# Patient Record
Sex: MALE | Race: WHITE | NOT HISPANIC OR LATINO | URBAN - METROPOLITAN AREA
[De-identification: names, ages, dates, MRNs, and addresses within clinical notes are randomized per-mention and may not be internally consistent; named-entity substitution may affect disease eponyms.]

---

## 2021-02-12 ENCOUNTER — OFFICE VISIT (OUTPATIENT)
Dept: URBAN - METROPOLITAN AREA CLINIC 63 | Facility: CLINIC | Age: 56
End: 2021-02-12

## 2021-03-02 ENCOUNTER — OFFICE VISIT (OUTPATIENT)
Dept: URBAN - METROPOLITAN AREA SURGERY CENTER 4 | Facility: SURGERY CENTER | Age: 56
End: 2021-03-02

## 2021-03-04 LAB — PATHOLOGY (INDENTED REPORT): (no result)

## 2021-03-17 ENCOUNTER — OFFICE VISIT (OUTPATIENT)
Dept: URBAN - METROPOLITAN AREA CLINIC 63 | Facility: CLINIC | Age: 56
End: 2021-03-17

## 2021-04-28 ENCOUNTER — OFFICE VISIT (OUTPATIENT)
Dept: URBAN - METROPOLITAN AREA CLINIC 63 | Facility: CLINIC | Age: 56
End: 2021-04-28

## 2021-07-21 ENCOUNTER — OFFICE VISIT (OUTPATIENT)
Dept: URBAN - METROPOLITAN AREA CLINIC 63 | Facility: CLINIC | Age: 56
End: 2021-07-21

## 2022-07-09 ENCOUNTER — TELEPHONE ENCOUNTER (OUTPATIENT)
Dept: URBAN - METROPOLITAN AREA CLINIC 121 | Facility: CLINIC | Age: 57
End: 2022-07-09

## 2022-07-09 RX ORDER — CHROMIUM 200 MCG
TABLET ORAL ONCE A DAY
Refills: 0 | OUTPATIENT
Start: 2021-03-17 | End: 2021-04-28

## 2022-07-09 RX ORDER — ASPIRIN 81 MG/1
TABLET, FILM COATED ORAL ONCE A DAY
Refills: 0 | OUTPATIENT
Start: 2021-03-17 | End: 2021-04-28

## 2022-07-09 RX ORDER — ASPIRIN 81 MG/1
TABLET, FILM COATED ORAL
Refills: 0 | OUTPATIENT
Start: 2021-02-12 | End: 2021-03-17

## 2022-07-09 RX ORDER — OMEGA-3S/DHA/EPA/FISH OIL 980-1400MG
CAPSULE,DELAYED RELEASE (ENTERIC COATED) ORAL ONCE A DAY
Refills: 0 | OUTPATIENT
Start: 2021-03-17 | End: 2021-04-28

## 2022-07-09 RX ORDER — OMEPRAZOLE 20 MG/1
TAKE ONE CAPSULE PO TWICE DAILY 30 MINS BEFORE MEALS CAPSULE, DELAYED RELEASE ORAL TWICE A DAY
Refills: 1 | OUTPATIENT
Start: 2021-02-12 | End: 2021-09-20

## 2022-07-09 RX ORDER — CHROMIUM 200 MCG
TABLET ORAL
Refills: 0 | OUTPATIENT
Start: 2021-02-12 | End: 2021-03-17

## 2022-07-09 RX ORDER — OMEPRAZOLE 20 MG/1
TAKE ONE CAPSULE PO TWICE DAILY 30 MINS BEFORE MEALS CAPSULE, DELAYED RELEASE ORAL TWICE A DAY
Refills: 1 | OUTPATIENT
Start: 2021-09-20 | End: 2021-11-19

## 2022-07-10 ENCOUNTER — TELEPHONE ENCOUNTER (OUTPATIENT)
Dept: URBAN - METROPOLITAN AREA CLINIC 121 | Facility: CLINIC | Age: 57
End: 2022-07-10

## 2022-07-10 RX ORDER — CHROMIUM 200 MCG
TABLET ORAL ONCE A DAY
Refills: 0 | Status: ACTIVE | COMMUNITY
Start: 2021-04-28

## 2022-07-10 RX ORDER — OMEPRAZOLE 20 MG/1
TAKE ONE CAPSULE PO TWICE DAILY 30 MINS BEFORE MEALS CAPSULE, DELAYED RELEASE ORAL TWICE A DAY
Refills: 1 | Status: ACTIVE | COMMUNITY
Start: 2021-02-12

## 2022-07-10 RX ORDER — ASPIRIN 81 MG/1
TABLET, FILM COATED ORAL ONCE A DAY
Refills: 0 | Status: ACTIVE | COMMUNITY
Start: 2021-04-28

## 2022-07-10 RX ORDER — DICYCLOMINE HYDROCHLORIDE 10 MG/1
TAKE UP TO THREE TIMES A DAY PO CAPSULE ORAL THREE TIMES A DAY
Refills: 1 | Status: ACTIVE | COMMUNITY
Start: 2021-03-17

## 2022-07-10 RX ORDER — OMEPRAZOLE 20 MG/1
USE AS DIRECTED TAKE ONE CAPSULE PO ONCE DAILY 30 MINUTES BEFORE BREAKFAST CAPSULE, DELAYED RELEASE ORAL
Refills: 1 | Status: ACTIVE | COMMUNITY
Start: 2021-03-17

## 2022-07-10 RX ORDER — OMEGA-3S/DHA/EPA/FISH OIL 980-1400MG
CAPSULE,DELAYED RELEASE (ENTERIC COATED) ORAL ONCE A DAY
Refills: 0 | Status: ACTIVE | COMMUNITY
Start: 2021-04-28

## 2022-07-10 RX ORDER — ONDANSETRON HYDROCHLORIDE 4 MG/1
USE AS DIRECTED TAKE ONE TABLET 30 MINUTES BEFORE EACH BOTTLE OF MAGNESIUM CITRATE FOR COLONOSCOPY PREP TABLET, FILM COATED ORAL
Refills: 0 | Status: ACTIVE | COMMUNITY
Start: 2021-02-12

## 2022-07-10 RX ORDER — OMEPRAZOLE 20 MG/1
TAKE ONE CAPSULE PO TWICE DAILY 30 MINS BEFORE MEALS CAPSULE, DELAYED RELEASE ORAL TWICE A DAY
Refills: 1 | Status: ACTIVE | COMMUNITY
Start: 2021-11-19

## 2022-07-30 ENCOUNTER — TELEPHONE ENCOUNTER (OUTPATIENT)
Age: 57
End: 2022-07-30

## 2022-07-31 ENCOUNTER — TELEPHONE ENCOUNTER (OUTPATIENT)
Age: 57
End: 2022-07-31

## 2022-12-02 ENCOUNTER — TELEPHONE ENCOUNTER (OUTPATIENT)
Dept: URBAN - METROPOLITAN AREA CLINIC 63 | Facility: CLINIC | Age: 57
End: 2022-12-02

## 2022-12-02 RX ORDER — OMEPRAZOLE 20 MG/1
USE AS DIRECTED TAKE ONE CAPSULE PO ONCE DAILY 30 MINUTES BEFORE BREAKFAST CAPSULE, DELAYED RELEASE ORAL
Qty: 90 CAPSULE | Refills: 1

## 2023-01-10 ENCOUNTER — OFFICE VISIT (OUTPATIENT)
Dept: URBAN - METROPOLITAN AREA CLINIC 63 | Facility: CLINIC | Age: 58
End: 2023-01-10

## 2023-01-10 ENCOUNTER — TELEPHONE ENCOUNTER (OUTPATIENT)
Dept: URBAN - METROPOLITAN AREA CLINIC 63 | Facility: CLINIC | Age: 58
End: 2023-01-10

## 2023-01-10 NOTE — HPI-TODAY'S VISIT:
Rico is a pleasant 56-year-old male who presents today for follow up.     FibroScan dated 2/17/2021 showed S0 and F0    EGD dated 3/2/2021 revealed a regular Z-line.  Gastritis.  Normal third portion of the duodenum. Negative for H. pylori.  Benign lower third esophageal biopsies.    Colonoscopy dated 3/2/2021 revealed 1 diminutive tubular adenoma removed at the hepatic flexure.  2 diminutive HP polyps removed at the rectosigmoid colon.  Nonbleeding internal hemorrhoids    CT of the abdomen/pelvis with contrast dated 2/24/2021 revealed a single colonic diverticula involving the ascending colon with moderate colonic wall thickening involving the cecum.  No pericolonic inflammation or lymphadenopathy.  These findings are nonspecific but differential includes early ascending diverticulitis versus cecal mass.  Appendix is normal.  Multiple hepatic lesions as above, largest measuring 1.8 x 1.7 cm.  These likely represent hepatic cysts but are indeterminant on this single phase examination.  Mild prostatomegaly.  Sludge in the gallbladder.  Mild nonspecific bowel wall thickening involving loops of jejunum in the left upper quadrant.    Ultrasound of the abdomen dated 1/6/2021 revealed hepatic steatosis.  Multi septated cyst of the right hepatic lobe.    CT enterography dated 3/24/2021 demonstrated suspicious mild colitis involving the ascending colon, cecum and portions of the proximal transverse colon.  Interval resolution of the previously seen jejunal wall thickening. Findings not appreciated on colonoscopy after reviewing imaging with radiology. Patient declined flex sig and denied symptoms consistent with colitis. Elected to repeat MRE instead.    MRE dated 4/21/2021 demonstrated no active inflammatory process identified within large or small bowel.  Mild circumferential wall prominence and delayed mucosal hyperenhancement of a short segment of the cecum at the level of the ileocecal valve the etiology of which is uncertain.  Recommend correlation with colonoscopy.  Enhancement pattern and presence in February 2021 suggest low-grade chronic process. Again, no abnormality appreciated on colonoscopy.    Recommended last visit he follow up with urology and general surgery. He was evaluated by urology and started on an alpha blocker. Notes 1-2 daily bowel movements without constipation or diarrhea. Reflux well controlled on omeprozole 20 mg once daily after decreasing therapy last visit. Denies any nausea, vomiting, dysphagia, odynophagia, hematemesis, coffee ground emesis, change in bowel habits, abnormal weight loss, BRBPR or melena. Continued lower abdominal discomfort when he "puts on a belt". Pain is non related to BMs, worse with large meals and certain positions/strenuous activity. Denies any family history of colon cancer or colon polyps. Notes no other GI complaints at this time. Has admitted to bilateral congenital inguinal hernias not appreciated on imaging

## 2023-12-12 PROBLEM — 197321007: Status: ACTIVE | Noted: 2023-12-12

## 2023-12-13 ENCOUNTER — LAB OUTSIDE AN ENCOUNTER (OUTPATIENT)
Dept: URBAN - METROPOLITAN AREA CLINIC 63 | Facility: CLINIC | Age: 58
End: 2023-12-13

## 2023-12-13 ENCOUNTER — OFFICE VISIT (OUTPATIENT)
Dept: URBAN - METROPOLITAN AREA CLINIC 63 | Facility: CLINIC | Age: 58
End: 2023-12-13
Payer: COMMERCIAL

## 2023-12-13 VITALS
TEMPERATURE: 97 F | DIASTOLIC BLOOD PRESSURE: 80 MMHG | HEIGHT: 65 IN | SYSTOLIC BLOOD PRESSURE: 122 MMHG | WEIGHT: 172.4 LBS | BODY MASS INDEX: 28.72 KG/M2 | OXYGEN SATURATION: 96 % | HEART RATE: 72 BPM

## 2023-12-13 DIAGNOSIS — R93.3 ABNORMAL FINDINGS ON DIAGNOSTIC IMAGING OF DIGESTIVE SYSTEM: ICD-10-CM

## 2023-12-13 DIAGNOSIS — R10.9 ABDOMINAL CRAMPING: ICD-10-CM

## 2023-12-13 DIAGNOSIS — K76.0 FATTY LIVER: ICD-10-CM

## 2023-12-13 DIAGNOSIS — K21.9 CHRONIC GERD: ICD-10-CM

## 2023-12-13 PROBLEM — 235595009: Status: ACTIVE | Noted: 2023-12-13

## 2023-12-13 PROCEDURE — 99214 OFFICE O/P EST MOD 30 MIN: CPT | Performed by: PHYSICIAN ASSISTANT

## 2023-12-13 RX ORDER — DICYCLOMINE HYDROCHLORIDE 10 MG/1
TAKE UP TO THREE TIMES A DAY PO CAPSULE ORAL THREE TIMES A DAY
Refills: 1 | Status: ACTIVE | COMMUNITY
Start: 2021-03-17

## 2023-12-13 RX ORDER — OMEGA-3S/DHA/EPA/FISH OIL 980-1400MG
CAPSULE,DELAYED RELEASE (ENTERIC COATED) ORAL ONCE A DAY
Refills: 0 | Status: ACTIVE | COMMUNITY
Start: 2021-04-28

## 2023-12-13 RX ORDER — CHROMIUM 200 MCG
TABLET ORAL ONCE A DAY
Refills: 0 | Status: ACTIVE | COMMUNITY
Start: 2021-04-28

## 2023-12-13 RX ORDER — OMEPRAZOLE 20 MG/1
USE AS DIRECTED TAKE ONE CAPSULE PO ONCE DAILY 30 MINUTES BEFORE BREAKFAST CAPSULE, DELAYED RELEASE ORAL
Qty: 90 CAPSULE | Refills: 1 | Status: ACTIVE | COMMUNITY

## 2023-12-13 RX ORDER — ASPIRIN 81 MG/1
TABLET, FILM COATED ORAL ONCE A DAY
Refills: 0 | Status: ACTIVE | COMMUNITY
Start: 2021-04-28

## 2023-12-13 NOTE — HPI-TODAY'S VISIT:
Rico is a pleasant 58-year-old male who presents today for a follow up.  Patient was evaluated by Dr. Watkins for evaluation of chronic lower abdominal pain.  Concern of inguinal hernia.  Patient chose observation over surgical intervention. Evaluated by vascular surgery for annual surveillance of prior intervention to left ICA and mild stenosis to the right ICA.  Left CEA performed in 2017.  CT abdomen/pelvis without contrast dated 9/26/2023 showed no renal calculi or hydronephrosis visualized.  No acute findings seen within the abdomen and pelvis.  Marked hepatic steatosis.  Normal appendix  Labs dated 6/4/23 showed a normal hemoglobin.  Normal platelets.  Normal renal function.  Normal LFTs.  Iron studies normal.  Normal T4.  Normal folate.  Normal TSH.  Vitamin B12 normal.  FibroScan dated 2/17/2021 showed S0 and F0   EGD dated 3/2/2021 revealed a regular Z-line.  Gastritis.  Normal third portion of the duodenum. Negative for H. pylori.  Benign lower third esophageal biopsies.   Colonoscopy dated 3/2/2021 revealed 1 diminutive tubular adenoma removed at the hepatic flexure.  2 diminutive HP polyps removed at the rectosigmoid colon.  Nonbleeding internal hemorrhoids   CT of the abdomen/pelvis with contrast dated 2/24/2021 revealed a single colonic diverticula involving the ascending colon with moderate colonic wall thickening involving the cecum.  No pericolonic inflammation or lymphadenopathy.  These findings are nonspecific but differential includes early ascending diverticulitis versus cecal mass.  Appendix is normal.  Multiple hepatic lesions as above, largest measuring 1.8 x 1.7 cm.  These likely represent hepatic cysts but are indeterminant on this single phase examination.  Mild prostatomegaly.  Sludge in the gallbladder.  Mild nonspecific bowel wall thickening involving loops of jejunum in the left upper quadrant.   Ultrasound of the abdomen dated 1/6/2021 revealed hepatic steatosis.  Multi septated cyst of the right hepatic lobe.   CT enterography dated 3/24/2021 demonstrated suspicious mild colitis involving the ascending colon, cecum and portions of the proximal transverse colon.  Interval resolution of the previously seen jejunal wall thickening. Findings not appreciated on colonoscopy after reviewing imaging with radiology. Patient declined flex sig and denied symptoms consistent with colitis. Elected to repeat MRE instead.   MRE dated 4/21/2021 demonstrated no active inflammatory process identified within large or small bowel.  Mild circumferential wall prominence and delayed mucosal hyperenhancement of a short segment of the cecum at the level of the ileocecal valve the etiology of which is uncertain.  Recommend correlation with colonoscopy.  Enhancement pattern and presence in February 2021 suggest low-grade chronic process. Again, no abnormality appreciated on colonoscopy.   Notes 1-2 daily bowel movements without constipation or diarrhea. Reflux well controlled on omeprozole 20 mg once daily. Denies any nausea, vomiting, dysphagia, odynophagia, hematemesis, coffee ground emesis, change in bowel habits, abnormal weight loss, BRBPR or melena. Occasional abdominal cramping. Pain is non related to BMs, food or position. Denies any family history of colon cancer or colon polyps. Notes no other GI complaints at this time.

## 2024-01-11 ENCOUNTER — TELEPHONE ENCOUNTER (OUTPATIENT)
Dept: URBAN - METROPOLITAN AREA CLINIC 63 | Facility: CLINIC | Age: 59
End: 2024-01-11

## 2024-01-12 ENCOUNTER — TELEPHONE ENCOUNTER (OUTPATIENT)
Dept: URBAN - METROPOLITAN AREA CLINIC 63 | Facility: CLINIC | Age: 59
End: 2024-01-12

## 2024-01-31 ENCOUNTER — DASHBOARD ENCOUNTERS (OUTPATIENT)
Age: 59
End: 2024-01-31

## 2024-01-31 ENCOUNTER — OFFICE VISIT (OUTPATIENT)
Dept: URBAN - METROPOLITAN AREA CLINIC 63 | Facility: CLINIC | Age: 59
End: 2024-01-31
Payer: COMMERCIAL

## 2024-01-31 VITALS
BODY MASS INDEX: 29.32 KG/M2 | HEIGHT: 65 IN | WEIGHT: 176 LBS | OXYGEN SATURATION: 96 % | TEMPERATURE: 98.6 F | DIASTOLIC BLOOD PRESSURE: 80 MMHG | HEART RATE: 72 BPM | RESPIRATION RATE: 20 BRPM | SYSTOLIC BLOOD PRESSURE: 126 MMHG

## 2024-01-31 DIAGNOSIS — K21.9 CHRONIC GERD: ICD-10-CM

## 2024-01-31 DIAGNOSIS — D12.6 TUBULAR ADENOMA OF COLON: ICD-10-CM

## 2024-01-31 DIAGNOSIS — R10.9 ABDOMINAL CRAMPING: ICD-10-CM

## 2024-01-31 DIAGNOSIS — R93.3 ABNORMAL FINDINGS ON DIAGNOSTIC IMAGING OF DIGESTIVE SYSTEM: ICD-10-CM

## 2024-01-31 PROCEDURE — 99213 OFFICE O/P EST LOW 20 MIN: CPT | Performed by: PHYSICIAN ASSISTANT

## 2024-01-31 RX ORDER — OMEPRAZOLE 20 MG/1
USE AS DIRECTED TAKE ONE CAPSULE PO ONCE DAILY 30 MINUTES BEFORE BREAKFAST CAPSULE, DELAYED RELEASE ORAL
Qty: 90 CAPSULE | Refills: 1 | Status: ACTIVE | COMMUNITY

## 2024-01-31 RX ORDER — DICYCLOMINE HYDROCHLORIDE 10 MG/1
TAKE UP TO THREE TIMES A DAY PO CAPSULE ORAL THREE TIMES A DAY
Refills: 1 | Status: ACTIVE | COMMUNITY
Start: 2021-03-17

## 2024-01-31 RX ORDER — CHROMIUM 200 MCG
TABLET ORAL ONCE A DAY
Refills: 0 | Status: ACTIVE | COMMUNITY
Start: 2021-04-28

## 2024-01-31 RX ORDER — ASPIRIN 81 MG/1
TABLET, FILM COATED ORAL ONCE A DAY
Refills: 0 | Status: ACTIVE | COMMUNITY
Start: 2021-04-28

## 2024-01-31 RX ORDER — OMEGA-3S/DHA/EPA/FISH OIL 980-1400MG
CAPSULE,DELAYED RELEASE (ENTERIC COATED) ORAL ONCE A DAY
Refills: 0 | Status: ACTIVE | COMMUNITY
Start: 2021-04-28